# Patient Record
Sex: FEMALE | Employment: UNEMPLOYED | URBAN - METROPOLITAN AREA
[De-identification: names, ages, dates, MRNs, and addresses within clinical notes are randomized per-mention and may not be internally consistent; named-entity substitution may affect disease eponyms.]

---

## 2024-01-11 ENCOUNTER — OFFICE VISIT (OUTPATIENT)
Dept: FAMILY MEDICINE CLINIC | Age: 29
End: 2024-01-11
Payer: COMMERCIAL

## 2024-01-11 VITALS
WEIGHT: 289.8 LBS | RESPIRATION RATE: 18 BRPM | OXYGEN SATURATION: 98 % | BODY MASS INDEX: 51.35 KG/M2 | DIASTOLIC BLOOD PRESSURE: 68 MMHG | SYSTOLIC BLOOD PRESSURE: 94 MMHG | HEART RATE: 100 BPM | HEIGHT: 63 IN | TEMPERATURE: 97.8 F

## 2024-01-11 DIAGNOSIS — Z00.00 ENCOUNTER FOR MEDICAL EXAMINATION TO ESTABLISH CARE: Primary | ICD-10-CM

## 2024-01-11 DIAGNOSIS — F32.A ANXIETY AND DEPRESSION: ICD-10-CM

## 2024-01-11 DIAGNOSIS — M54.50 ACUTE BILATERAL LOW BACK PAIN WITHOUT SCIATICA: ICD-10-CM

## 2024-01-11 DIAGNOSIS — M54.2 CHRONIC NECK PAIN: ICD-10-CM

## 2024-01-11 DIAGNOSIS — G89.29 CHRONIC NECK PAIN: ICD-10-CM

## 2024-01-11 DIAGNOSIS — R20.0 NUMBNESS AND TINGLING IN BOTH HANDS: ICD-10-CM

## 2024-01-11 DIAGNOSIS — G47.00 INSOMNIA, UNSPECIFIED TYPE: ICD-10-CM

## 2024-01-11 DIAGNOSIS — R20.2 NUMBNESS AND TINGLING IN BOTH HANDS: ICD-10-CM

## 2024-01-11 DIAGNOSIS — F41.9 ANXIETY AND DEPRESSION: ICD-10-CM

## 2024-01-11 DIAGNOSIS — E08.00 DIABETES MELLITUS DUE TO UNDERLYING CONDITION WITH HYPEROSMOLARITY WITHOUT COMA, WITHOUT LONG-TERM CURRENT USE OF INSULIN (HCC): ICD-10-CM

## 2024-01-11 DIAGNOSIS — E55.9 VITAMIN D DEFICIENCY: ICD-10-CM

## 2024-01-11 LAB
ABSOLUTE IMMATURE GRANULOCYTE: 0.03 K/UL (ref 0–0.58)
ALBUMIN SERPL-MCNC: 4.6 G/DL (ref 3.5–5.2)
ALP BLD-CCNC: 76 U/L (ref 35–104)
ALT SERPL-CCNC: 29 U/L (ref 0–32)
ANION GAP SERPL CALCULATED.3IONS-SCNC: 17 MMOL/L (ref 7–16)
AST SERPL-CCNC: 26 U/L (ref 0–31)
BASOPHILS ABSOLUTE: 0.04 K/UL (ref 0–0.2)
BASOPHILS RELATIVE PERCENT: 1 % (ref 0–2)
BILIRUB SERPL-MCNC: 0.4 MG/DL (ref 0–1.2)
BUN BLDV-MCNC: 12 MG/DL (ref 6–20)
CALCIUM SERPL-MCNC: 9.6 MG/DL (ref 8.6–10.2)
CHLORIDE BLD-SCNC: 106 MMOL/L (ref 98–107)
CHOLESTEROL: 206 MG/DL
CO2: 17 MMOL/L (ref 22–29)
CREAT SERPL-MCNC: 0.6 MG/DL (ref 0.5–1)
EOSINOPHILS ABSOLUTE: 0.13 K/UL (ref 0.05–0.5)
EOSINOPHILS RELATIVE PERCENT: 2 % (ref 0–6)
GFR SERPL CREATININE-BSD FRML MDRD: >60 ML/MIN/1.73M2
GLUCOSE BLD-MCNC: 108 MG/DL (ref 74–99)
HBA1C MFR BLD: 5.8 %
HCT VFR BLD CALC: 40.4 % (ref 34–48)
HDLC SERPL-MCNC: 31 MG/DL
HEMOGLOBIN: 12.7 G/DL (ref 11.5–15.5)
IMMATURE GRANULOCYTES: 0 % (ref 0–5)
LDL CHOLESTEROL: 138 MG/DL
LYMPHOCYTES ABSOLUTE: 3.24 K/UL (ref 1.5–4)
LYMPHOCYTES RELATIVE PERCENT: 40 % (ref 20–42)
MCH RBC QN AUTO: 27.3 PG (ref 26–35)
MCHC RBC AUTO-ENTMCNC: 31.4 G/DL (ref 32–34.5)
MCV RBC AUTO: 86.9 FL (ref 80–99.9)
MONOCYTES ABSOLUTE: 0.61 K/UL (ref 0.1–0.95)
MONOCYTES RELATIVE PERCENT: 8 % (ref 2–12)
NEUTROPHILS ABSOLUTE: 4.05 K/UL (ref 1.8–7.3)
NEUTROPHILS RELATIVE PERCENT: 50 % (ref 43–80)
PDW BLD-RTO: 13.6 % (ref 11.5–15)
PLATELET # BLD: 326 K/UL (ref 130–450)
PMV BLD AUTO: 10.6 FL (ref 7–12)
POTASSIUM SERPL-SCNC: 4.2 MMOL/L (ref 3.5–5)
RBC # BLD: 4.65 M/UL (ref 3.5–5.5)
SODIUM BLD-SCNC: 140 MMOL/L (ref 132–146)
TOTAL PROTEIN: 8 G/DL (ref 6.4–8.3)
TRIGL SERPL-MCNC: 185 MG/DL
TSH SERPL DL<=0.05 MIU/L-ACNC: 2.07 UIU/ML (ref 0.27–4.2)
VITAMIN D 25-HYDROXY: 13 NG/ML (ref 30–100)
VLDLC SERPL CALC-MCNC: 37 MG/DL
WBC # BLD: 8.1 K/UL (ref 4.5–11.5)

## 2024-01-11 PROCEDURE — 83036 HEMOGLOBIN GLYCOSYLATED A1C: CPT | Performed by: STUDENT IN AN ORGANIZED HEALTH CARE EDUCATION/TRAINING PROGRAM

## 2024-01-11 PROCEDURE — 99204 OFFICE O/P NEW MOD 45 MIN: CPT | Performed by: STUDENT IN AN ORGANIZED HEALTH CARE EDUCATION/TRAINING PROGRAM

## 2024-01-11 RX ORDER — SEMAGLUTIDE 2.68 MG/ML
INJECTION, SOLUTION SUBCUTANEOUS
Qty: 12 ML | Refills: 1 | Status: SHIPPED | OUTPATIENT
Start: 2024-01-11

## 2024-01-11 RX ORDER — VENLAFAXINE HYDROCHLORIDE 75 MG/1
75 CAPSULE, EXTENDED RELEASE ORAL NIGHTLY
Qty: 90 CAPSULE | Refills: 1 | Status: SHIPPED | OUTPATIENT
Start: 2024-01-11

## 2024-01-11 RX ORDER — QUETIAPINE FUMARATE 300 MG/1
300 TABLET, FILM COATED ORAL NIGHTLY
Qty: 90 TABLET | Refills: 1 | Status: SHIPPED | OUTPATIENT
Start: 2024-01-11

## 2024-01-11 RX ORDER — TRAZODONE HYDROCHLORIDE 50 MG/1
50 TABLET ORAL DAILY PRN
Qty: 90 TABLET | Refills: 1 | Status: SHIPPED | OUTPATIENT
Start: 2024-01-11

## 2024-01-11 RX ORDER — CARIPRAZINE 6 MG/1
6 CAPSULE, GELATIN COATED ORAL NIGHTLY
Qty: 90 CAPSULE | Refills: 1 | Status: SHIPPED | OUTPATIENT
Start: 2024-01-11

## 2024-01-11 RX ORDER — ESCITALOPRAM OXALATE 20 MG/1
20 TABLET ORAL DAILY
Qty: 30 TABLET | Refills: 3 | Status: SHIPPED | OUTPATIENT
Start: 2024-01-11

## 2024-01-11 SDOH — ECONOMIC STABILITY: FOOD INSECURITY: WITHIN THE PAST 12 MONTHS, THE FOOD YOU BOUGHT JUST DIDN'T LAST AND YOU DIDN'T HAVE MONEY TO GET MORE.: NEVER TRUE

## 2024-01-11 SDOH — ECONOMIC STABILITY: HOUSING INSECURITY
IN THE LAST 12 MONTHS, WAS THERE A TIME WHEN YOU DID NOT HAVE A STEADY PLACE TO SLEEP OR SLEPT IN A SHELTER (INCLUDING NOW)?: NO

## 2024-01-11 SDOH — ECONOMIC STABILITY: FOOD INSECURITY: WITHIN THE PAST 12 MONTHS, YOU WORRIED THAT YOUR FOOD WOULD RUN OUT BEFORE YOU GOT MONEY TO BUY MORE.: NEVER TRUE

## 2024-01-11 SDOH — ECONOMIC STABILITY: INCOME INSECURITY: HOW HARD IS IT FOR YOU TO PAY FOR THE VERY BASICS LIKE FOOD, HOUSING, MEDICAL CARE, AND HEATING?: NOT HARD AT ALL

## 2024-01-11 ASSESSMENT — PATIENT HEALTH QUESTIONNAIRE - PHQ9
2. FEELING DOWN, DEPRESSED OR HOPELESS: 1
SUM OF ALL RESPONSES TO PHQ9 QUESTIONS 1 & 2: 2
SUM OF ALL RESPONSES TO PHQ QUESTIONS 1-9: 2
SUM OF ALL RESPONSES TO PHQ QUESTIONS 1-9: 2
1. LITTLE INTEREST OR PLEASURE IN DOING THINGS: 1
SUM OF ALL RESPONSES TO PHQ QUESTIONS 1-9: 2
SUM OF ALL RESPONSES TO PHQ QUESTIONS 1-9: 2

## 2024-01-11 ASSESSMENT — ENCOUNTER SYMPTOMS
RHINORRHEA: 0
VOMITING: 0
EYE PAIN: 0
NAUSEA: 0
SINUS PRESSURE: 0
COUGH: 0
ABDOMINAL PAIN: 0
EYE REDNESS: 0
SHORTNESS OF BREATH: 0
SINUS PAIN: 0
CONSTIPATION: 0
SORE THROAT: 0
BACK PAIN: 1
DIARRHEA: 0

## 2024-01-11 NOTE — PROGRESS NOTES
1/11/2024    [facility]  Chief Complaint   Patient presents with    New Patient    Diabetes    Back Pain    Neck Pain    Numbness       Laurie Dexter is a 28 y.o. female who  has a past medical history of Anxiety, Depression, and Type 2 diabetes mellitus without complication (HCC).     Patient is here today to establish care myself.  Patient has no known allergies.  Surgical history includes cholecystectomy.    DM2:   Patient is here to fu regarding DM2. Patient is  controlled. Taking all medications and tolerating well. Patient is not taking ASA and Ace Inhibitor/ARB. Patient is not on appropriately-dosed statin.   BP is  controlled.  does not hypoglycemic episodes. Patient does not see Podiatry regularly.  Patient is aware that it is necessary to see an Eye Dr yearly.  Patient does not smoke.  Most recent labs reviewed with patient.  Patient does have complaints or concerns today.      Lab Results   Component Value Date    LABA1C 5.8 01/11/2024     Back Pain  Patient presents for evaluation of low back problems. Symptoms have been present for 3 days and include  pain in lower back . Initial inciting event: none. Alleviating factors identifiable by the patient are  certain movements . Aggravating factors identifiable by the patient are bending backwards and bending forwards. Treatments initiated by the patient: none. Previous lower back problems:  she was told she had scoliosis in the past . Previous work up: none. Previous treatments: none.    Neck Pain  Patient complains of neck pain. Event that precipitated these symptoms: none known. Onset of symptoms several  years  ago, and have been gradually worsening since that time. Current symptoms are  pain in neck, numbness and tingling in both her hands .  Patient has had recurrent self limited episodes of neck pain in the past. Previous treatments include: none.         Review of Systems   Constitutional:  Negative for chills, fatigue and fever.   HENT:  Negative for

## 2024-01-12 DIAGNOSIS — E55.9 VITAMIN D DEFICIENCY: ICD-10-CM

## 2024-01-12 DIAGNOSIS — E55.9 VITAMIN D DEFICIENCY: Primary | ICD-10-CM

## 2024-01-12 LAB — VITAMIN B-12: 421 PG/ML (ref 211–946)

## 2024-01-12 RX ORDER — ERGOCALCIFEROL 1.25 MG/1
50000 CAPSULE ORAL WEEKLY
Qty: 12 CAPSULE | Refills: 1 | Status: SHIPPED | OUTPATIENT
Start: 2024-01-12

## 2024-01-12 RX ORDER — ERGOCALCIFEROL 1.25 MG/1
50000 CAPSULE ORAL WEEKLY
Qty: 12 CAPSULE | Refills: 1 | Status: SHIPPED
Start: 2024-01-12 | End: 2024-01-12 | Stop reason: SDUPTHER

## 2024-03-29 ENCOUNTER — HOSPITAL ENCOUNTER (INPATIENT)
Age: 29
LOS: 4 days | Discharge: HOME | DRG: 885 | End: 2024-04-03
Attending: EMERGENCY MEDICINE | Admitting: PSYCHIATRY & NEUROLOGY
Payer: COMMERCIAL

## 2024-03-29 DIAGNOSIS — T50.902A INTENTIONAL OVERDOSE, INITIAL ENCOUNTER (HCC): ICD-10-CM

## 2024-03-29 DIAGNOSIS — T14.91XA SUICIDE ATTEMPT (HCC): Primary | ICD-10-CM

## 2024-03-29 LAB
ALBUMIN SERPL-MCNC: 4.5 G/DL (ref 3.5–5.2)
ALP SERPL-CCNC: 79 U/L (ref 35–104)
ALT SERPL-CCNC: 34 U/L (ref 0–32)
ANION GAP SERPL CALCULATED.3IONS-SCNC: 10 MMOL/L (ref 7–16)
APAP SERPL-MCNC: <5 UG/ML (ref 10–30)
AST SERPL-CCNC: 27 U/L (ref 0–31)
BASOPHILS # BLD: 0.03 K/UL (ref 0–0.2)
BASOPHILS NFR BLD: 0 % (ref 0–2)
BILIRUB SERPL-MCNC: 0.3 MG/DL (ref 0–1.2)
BUN SERPL-MCNC: 14 MG/DL (ref 6–20)
CALCIUM SERPL-MCNC: 9.6 MG/DL (ref 8.6–10.2)
CHLORIDE SERPL-SCNC: 104 MMOL/L (ref 98–107)
CO2 SERPL-SCNC: 25 MMOL/L (ref 22–29)
CREAT SERPL-MCNC: 0.6 MG/DL (ref 0.5–1)
EOSINOPHIL # BLD: 0.16 K/UL (ref 0.05–0.5)
EOSINOPHILS RELATIVE PERCENT: 2 % (ref 0–6)
ERYTHROCYTE [DISTWIDTH] IN BLOOD BY AUTOMATED COUNT: 13.2 % (ref 11.5–15)
ETHANOLAMINE SERPL-MCNC: <10 MG/DL
GFR SERPL CREATININE-BSD FRML MDRD: >90 ML/MIN/1.73M2
GLUCOSE SERPL-MCNC: 160 MG/DL (ref 74–99)
HCT VFR BLD AUTO: 41.5 % (ref 34–48)
HGB BLD-MCNC: 13.1 G/DL (ref 11.5–15.5)
IMM GRANULOCYTES # BLD AUTO: 0.03 K/UL (ref 0–0.58)
IMM GRANULOCYTES NFR BLD: 0 % (ref 0–5)
LYMPHOCYTES NFR BLD: 2.78 K/UL (ref 1.5–4)
LYMPHOCYTES RELATIVE PERCENT: 37 % (ref 20–42)
MCH RBC QN AUTO: 27.2 PG (ref 26–35)
MCHC RBC AUTO-ENTMCNC: 31.6 G/DL (ref 32–34.5)
MCV RBC AUTO: 86.3 FL (ref 80–99.9)
MONOCYTES NFR BLD: 0.59 K/UL (ref 0.1–0.95)
MONOCYTES NFR BLD: 8 % (ref 2–12)
NEUTROPHILS NFR BLD: 52 % (ref 43–80)
NEUTS SEG NFR BLD: 3.88 K/UL (ref 1.8–7.3)
PLATELET # BLD AUTO: 293 K/UL (ref 130–450)
PMV BLD AUTO: 10 FL (ref 7–12)
POTASSIUM SERPL-SCNC: 4.1 MMOL/L (ref 3.5–5)
PROT SERPL-MCNC: 8 G/DL (ref 6.4–8.3)
RBC # BLD AUTO: 4.81 M/UL (ref 3.5–5.5)
SALICYLATES SERPL-MCNC: <0.3 MG/DL (ref 0–30)
SODIUM SERPL-SCNC: 139 MMOL/L (ref 132–146)
TOXIC TRICYCLIC SC,BLOOD: NEGATIVE
WBC OTHER # BLD: 7.5 K/UL (ref 4.5–11.5)

## 2024-03-29 PROCEDURE — 99285 EMERGENCY DEPT VISIT HI MDM: CPT

## 2024-03-29 PROCEDURE — 80053 COMPREHEN METABOLIC PANEL: CPT

## 2024-03-29 PROCEDURE — 93005 ELECTROCARDIOGRAM TRACING: CPT | Performed by: EMERGENCY MEDICINE

## 2024-03-29 PROCEDURE — 80307 DRUG TEST PRSMV CHEM ANLYZR: CPT

## 2024-03-29 PROCEDURE — 80143 DRUG ASSAY ACETAMINOPHEN: CPT

## 2024-03-29 PROCEDURE — G0480 DRUG TEST DEF 1-7 CLASSES: HCPCS

## 2024-03-29 PROCEDURE — 80179 DRUG ASSAY SALICYLATE: CPT

## 2024-03-29 PROCEDURE — 85025 COMPLETE CBC W/AUTO DIFF WBC: CPT

## 2024-03-29 RX ORDER — SODIUM CHLORIDE 9 MG/ML
INJECTION, SOLUTION INTRAVENOUS CONTINUOUS
Status: DISCONTINUED | OUTPATIENT
Start: 2024-03-29 | End: 2024-04-03 | Stop reason: HOSPADM

## 2024-03-29 ASSESSMENT — PAIN - FUNCTIONAL ASSESSMENT: PAIN_FUNCTIONAL_ASSESSMENT: NONE - DENIES PAIN

## 2024-03-30 PROBLEM — F31.9 BIPOLAR 1 DISORDER (HCC): Status: ACTIVE | Noted: 2024-03-30

## 2024-03-30 LAB
AMPHET UR QL SCN: NEGATIVE
BARBITURATES UR QL SCN: NEGATIVE
BENZODIAZ UR QL: NEGATIVE
BILIRUB UR QL STRIP: NEGATIVE
BUPRENORPHINE UR QL: NEGATIVE
CANNABINOIDS UR QL SCN: NEGATIVE
CLARITY UR: CLEAR
COCAINE UR QL SCN: NEGATIVE
COLOR UR: YELLOW
COMMENT: ABNORMAL
EKG ATRIAL RATE: 88 BPM
EKG ATRIAL RATE: 97 BPM
EKG P AXIS: 45 DEGREES
EKG P AXIS: 49 DEGREES
EKG P-R INTERVAL: 160 MS
EKG P-R INTERVAL: 174 MS
EKG Q-T INTERVAL: 308 MS
EKG Q-T INTERVAL: 364 MS
EKG QRS DURATION: 92 MS
EKG QRS DURATION: 94 MS
EKG QTC CALCULATION (BAZETT): 372 MS
EKG QTC CALCULATION (BAZETT): 462 MS
EKG R AXIS: 75 DEGREES
EKG R AXIS: 81 DEGREES
EKG T AXIS: 31 DEGREES
EKG T AXIS: 36 DEGREES
EKG VENTRICULAR RATE: 88 BPM
EKG VENTRICULAR RATE: 97 BPM
FENTANYL UR QL: NEGATIVE
GLUCOSE UR STRIP-MCNC: NEGATIVE MG/DL
HCG, URINE, POC: NEGATIVE
HGB UR QL STRIP.AUTO: NEGATIVE
KETONES UR STRIP-MCNC: NEGATIVE MG/DL
LEUKOCYTE ESTERASE UR QL STRIP: NEGATIVE
Lab: NORMAL
METHADONE UR QL: NEGATIVE
NEGATIVE QC PASS/FAIL: NORMAL
NITRITE UR QL STRIP: NEGATIVE
OPIATES UR QL SCN: NEGATIVE
OXYCODONE UR QL SCN: NEGATIVE
PCP UR QL SCN: NEGATIVE
PH UR STRIP: 6 [PH] (ref 5–9)
POSITIVE QC PASS/FAIL: NORMAL
PROT UR STRIP-MCNC: NEGATIVE MG/DL
SP GR UR STRIP: >1.03 (ref 1–1.03)
TEST INFORMATION: NORMAL
UROBILINOGEN UR STRIP-ACNC: 1 EU/DL (ref 0–1)

## 2024-03-30 PROCEDURE — 81003 URINALYSIS AUTO W/O SCOPE: CPT

## 2024-03-30 PROCEDURE — 80307 DRUG TEST PRSMV CHEM ANLYZR: CPT

## 2024-03-30 PROCEDURE — 93010 ELECTROCARDIOGRAM REPORT: CPT | Performed by: INTERNAL MEDICINE

## 2024-03-30 PROCEDURE — 1240000000 HC EMOTIONAL WELLNESS R&B

## 2024-03-30 PROCEDURE — 93005 ELECTROCARDIOGRAM TRACING: CPT | Performed by: EMERGENCY MEDICINE

## 2024-03-30 RX ORDER — HALOPERIDOL 5 MG/1
5 TABLET ORAL EVERY 6 HOURS PRN
Status: DISCONTINUED | OUTPATIENT
Start: 2024-03-30 | End: 2024-04-03 | Stop reason: HOSPADM

## 2024-03-30 RX ORDER — HALOPERIDOL 5 MG/ML
5 INJECTION INTRAMUSCULAR EVERY 6 HOURS PRN
Status: DISCONTINUED | OUTPATIENT
Start: 2024-03-30 | End: 2024-04-03 | Stop reason: HOSPADM

## 2024-03-30 RX ORDER — MAGNESIUM HYDROXIDE/ALUMINUM HYDROXICE/SIMETHICONE 120; 1200; 1200 MG/30ML; MG/30ML; MG/30ML
30 SUSPENSION ORAL PRN
Status: DISCONTINUED | OUTPATIENT
Start: 2024-03-30 | End: 2024-04-03 | Stop reason: HOSPADM

## 2024-03-30 RX ORDER — HYDROXYZINE PAMOATE 50 MG/1
50 CAPSULE ORAL 3 TIMES DAILY PRN
Status: DISCONTINUED | OUTPATIENT
Start: 2024-03-30 | End: 2024-04-03 | Stop reason: HOSPADM

## 2024-03-30 RX ORDER — LANOLIN ALCOHOL/MO/W.PET/CERES
3 CREAM (GRAM) TOPICAL NIGHTLY PRN
Status: DISCONTINUED | OUTPATIENT
Start: 2024-03-30 | End: 2024-04-03 | Stop reason: HOSPADM

## 2024-03-30 RX ORDER — HYDROXYZINE HYDROCHLORIDE 10 MG/1
50 TABLET, FILM COATED ORAL 3 TIMES DAILY PRN
Status: DISCONTINUED | OUTPATIENT
Start: 2024-03-30 | End: 2024-03-30 | Stop reason: SDUPTHER

## 2024-03-30 RX ORDER — NICOTINE 21 MG/24HR
1 PATCH, TRANSDERMAL 24 HOURS TRANSDERMAL DAILY
Status: DISCONTINUED | OUTPATIENT
Start: 2024-03-30 | End: 2024-03-30

## 2024-03-30 RX ORDER — ACETAMINOPHEN 325 MG/1
650 TABLET ORAL EVERY 6 HOURS PRN
Status: DISCONTINUED | OUTPATIENT
Start: 2024-03-30 | End: 2024-04-03 | Stop reason: HOSPADM

## 2024-03-30 ASSESSMENT — SLEEP AND FATIGUE QUESTIONNAIRES
AVERAGE NUMBER OF SLEEP HOURS: 5
DO YOU HAVE DIFFICULTY SLEEPING: COMMENT
DO YOU USE A SLEEP AID: COMMENT

## 2024-03-30 ASSESSMENT — LIFESTYLE VARIABLES
HOW MANY STANDARD DRINKS CONTAINING ALCOHOL DO YOU HAVE ON A TYPICAL DAY: PATIENT DECLINED
HOW OFTEN DO YOU HAVE A DRINK CONTAINING ALCOHOL: PATIENT DECLINED

## 2024-03-30 NOTE — ED NOTES
Patient changed into hosp gown and pants one bag of belongings locked in G lockers. C/O at bedside

## 2024-03-30 NOTE — ED NOTES
Call placed with Brennan SCHUMACHER/ Dr. ALEJO Garcia to present patient for admission.  Message left awaiting a return phone call.

## 2024-03-30 NOTE — ED NOTES
Leena called saying that she was told that her granddaughter ws in the ED and would be admitted for psychiatric care.  She was asking status of patient.  Patient agreeable to let grandmother (Leena Cochran) know she is ok and is still in the ED but she does not wish to speak to her at this time. Leena # [phone]. That limited information was given.

## 2024-03-30 NOTE — ED NOTES
Patient has been accepted for admission to [facility] by Dr. Garcia. Patient to go to [facility]. Room #2321B. Called admitting and notified Marisabel.    Electronically signed by KRISTAN Macias, SOL on 3/30/2024 at 12:46 PM

## 2024-03-30 NOTE — ED NOTES
Nurse to nurse report given to Srinivasa YOST on [facility] which includes patient presentation complaint, pink slip, medications, lab results EKG Qtc, and past medical/ psych history and admitting orders.

## 2024-03-30 NOTE — PLAN OF CARE
Problem: Anxiety  Goal: Will report anxiety at manageable levels  Description: INTERVENTIONS:  1. Administer medication as ordered  2. Teach and rehearse alternative coping skills  3. Provide emotional support with 1:1 interaction with staff  Outcome: Progressing     Problem: Coping  Goal: Pt/Family able to verbalize concerns and demonstrate effective coping strategies  Description: INTERVENTIONS:  1. Assist patient/family to identify coping skills, available support systems and cultural and spiritual values  2. Provide emotional support, including active listening and acknowledgement of concerns of patient and caregivers  3. Reduce environmental stimuli, as able  4. Instruct patient/family in relaxation techniques, as appropriate  5. Assess for spiritual pain/suffering and initiate Spiritual Care, Psychosocial Clinical Specialist consults as needed  Outcome: Progressing     Problem: Change in Body Image  Goal: Pt/Family communicate acceptance of loss or change in body image and feel psychological comfort and peace  Description: INTERVENTIONS:  1. Assess patient/family anxiety and grief process related to change in body image, loss of functional status, loss of sense of self, and forgiveness  2. Provide emotional and spiritual support  3. Provide information about the patient's health status with consideration of family and cultural values  4. Communicate willingness to discuss loss and facilitate grief process with patient/family as appropriate  5. Emphasize sustaining relationships within family system and community, or neeta/spiritual traditions  6. Initiate Spiritual Care, Psychosocial Clinical Specialist consult as needed  Outcome: Progressing     Problem: Decision Making  Goal: Pt/Family able to effectively weigh alternatives and participate in decision making related to treatment and care  Description: INTERVENTIONS:  1. Determine when there are differences between patient's view, family's view, and

## 2024-03-30 NOTE — ED NOTES
Behavioral Health Crisis Assessment      Chief Complaint:     \"Pt reports taking fifteen 50mg trazodone approx 40 min ago. Pt denies chest pain, SOB, N/V. Pt also states she has made prior suicide attempts similar\"    Mental Status Exam:     Patient remains uncooperative with assessment process. Appears alert & oriented to self, situation, and place. CHAIM pt awareness of date/time. Patient thought process is CHAIM - however at the time of the ED Physician assessment she appeared to have a clear thought process. Speech was noted to be normal when she would engage with staff. CHAIM A/V hallucinations.    Legal Status:  [] Voluntary:  [x] Involuntary, Issued by: ED Physician    Gender:  [] Male [x] Female [] Transgender  [] Other    Sexual Orientation:  [x] Heterosexual [] Homosexual [] Bisexual [] Other    Brief Clinical Summary:    Patient is a 28 year old, female presenting to ER for reported suicide attempt by overdose on Trazadone. Patient allegedly took 15 of her 50 mg Trazadone pills. Patient did not disclose reason for suicidal ideation.    SW reviewed patient medical record. Patient is from [place], [place] and began presenting with [facility] providers in January 2024. Patient had reported a hx of depression, anxiety, & insomnia to her new PCP Jazmín Vargas DO. She reported a previous hx of being prescribed Lexapro, Seroquel, Effexor, & Trazadone. Patient was seen by her PCP on 1/11/2024 and was continued on her above medications, with the addition of Vraylar. At the time of her evaluation, she was noted to be dysphoric, & anxious/nervous.    Patient hx of psychiatric hospitalizations is unknown at this time. However likely, due to patient reports to the ED Physician on this encounter - that she has a hx of multiple suicide attempts.    Collateral Information:    N/A    Patient reports that she resides at   [address] [place] [id number]    Possible relative - Trey Cai?    Patient maiden name is Laurie Negrete

## 2024-03-30 NOTE — ED PROVIDER NOTES
tenderness  Abdomen: Soft.  Non tender. Non distended.  +BS.  No rebound, guarding, or rigidity. No pulsatile masses appreciated.  Musculoskeletal: Moves all extremities x 4. Warm and well perfused, no clubbing, cyanosis, or edema. Capillary refill <3 seconds  Skin: warm and dry. No rashes.   Neurologic: GCS 15, CN 2-12 grossly intact, no focal deficits, symmetric strength 5/5 in the upper and lower extremities bilaterally  Psych: Affect is flat depressed patient suicidal not homicidal no hallucination    -------------------------------------------------- RESULTS -------------------------------------------------  I have personally reviewed all laboratory and imaging results for this patient. Results are listed below.     LABS:  Results for orders placed or performed during the hospital encounter of 03/29/24   CBC with Auto Differential   Result Value Ref Range    WBC 7.5 4.5 - 11.5 k/uL    RBC 4.81 3.50 - 5.50 m/uL    Hemoglobin 13.1 11.5 - 15.5 g/dL    Hematocrit 41.5 34.0 - 48.0 %    MCV 86.3 80.0 - 99.9 fL    MCH 27.2 26.0 - 35.0 pg    MCHC 31.6 (L) 32.0 - 34.5 g/dL    RDW 13.2 11.5 - 15.0 %    Platelets 293 130 - 450 k/uL    MPV 10.0 7.0 - 12.0 fL    Neutrophils % 52 43.0 - 80.0 %    Lymphocytes % 37 20.0 - 42.0 %    Monocytes % 8 2.0 - 12.0 %    Eosinophils % 2 0 - 6 %    Basophils % 0 0.0 - 2.0 %    Immature Granulocytes 0 0.0 - 5.0 %    Neutrophils Absolute 3.88 1.80 - 7.30 k/uL    Lymphocytes Absolute 2.78 1.50 - 4.00 k/uL    Monocytes Absolute 0.59 0.10 - 0.95 k/uL    Eosinophils Absolute 0.16 0.05 - 0.50 k/uL    Basophils Absolute 0.03 0.00 - 0.20 k/uL    Absolute Immature Granulocyte 0.03 0.00 - 0.58 k/uL   Comprehensive Metabolic Panel   Result Value Ref Range    Sodium 139 132 - 146 mmol/L    Potassium 4.1 3.5 - 5.0 mmol/L    Chloride 104 98 - 107 mmol/L    CO2 25 22 - 29 mmol/L    Anion Gap 10 7 - 16 mmol/L    Glucose 160 (H) 74 - 99 mg/dL    BUN 14 6 - 20 mg/dL    Creatinine 0.6 0.50 - 1.00 mg/dL

## 2024-03-30 NOTE — ED NOTES
Poison Control called with recommendations of monitoring for symptoms of CNS depression, hypotension, and potential serotonin syndrome and seizures with a 6 hour observation period from ingestion.

## 2024-03-30 NOTE — ED NOTES
Pt refused to speak with SW. ED doc notified. Pt to be assessed in the am, pt is on an involuntary hold.

## 2024-03-30 NOTE — CARE COORDINATION
SW attempted to meet with the pt to complete biopsychosocial assessment. Pt observed standing in the milieu. Pt barely shook her head yes when SW was confirming SW was speaking to the right person. Pt shook her head no when SW asked to go sit down and talk. Pt stated she is not answering any questions because \"I don't need to.\" SW encouraged the pt to answer the questions at a later time as refusing to participate in treatment planning will hinder her discharge. Pt just looked at SW and then looked away. SW will try again at a later time.

## 2024-03-31 PROBLEM — F31.4 BIPOLAR 1 DISORDER, DEPRESSED, SEVERE (HCC): Status: ACTIVE | Noted: 2024-03-30

## 2024-03-31 PROCEDURE — 90792 PSYCH DIAG EVAL W/MED SRVCS: CPT | Performed by: PSYCHIATRY & NEUROLOGY

## 2024-03-31 PROCEDURE — 6370000000 HC RX 637 (ALT 250 FOR IP): Performed by: NURSE PRACTITIONER

## 2024-03-31 PROCEDURE — 6370000000 HC RX 637 (ALT 250 FOR IP): Performed by: PSYCHIATRY & NEUROLOGY

## 2024-03-31 PROCEDURE — 1240000000 HC EMOTIONAL WELLNESS R&B

## 2024-03-31 RX ORDER — CALCIUM CARBONATE 500 MG/1
500 TABLET, CHEWABLE ORAL 3 TIMES DAILY PRN
Status: DISCONTINUED | OUTPATIENT
Start: 2024-03-31 | End: 2024-04-03 | Stop reason: HOSPADM

## 2024-03-31 RX ORDER — OXCARBAZEPINE 150 MG/1
150 TABLET, FILM COATED ORAL 2 TIMES DAILY
Status: DISCONTINUED | OUTPATIENT
Start: 2024-03-31 | End: 2024-04-01

## 2024-03-31 RX ORDER — ARIPIPRAZOLE 5 MG/1
5 TABLET ORAL DAILY
Status: DISCONTINUED | OUTPATIENT
Start: 2024-03-31 | End: 2024-04-01

## 2024-03-31 RX ORDER — ONDANSETRON 4 MG/1
4 TABLET, ORALLY DISINTEGRATING ORAL EVERY 8 HOURS PRN
Status: DISCONTINUED | OUTPATIENT
Start: 2024-03-31 | End: 2024-04-03 | Stop reason: HOSPADM

## 2024-03-31 RX ADMIN — ONDANSETRON 4 MG: 4 TABLET, ORALLY DISINTEGRATING ORAL at 17:28

## 2024-03-31 RX ADMIN — OXCARBAZEPINE 150 MG: 150 TABLET, FILM COATED ORAL at 20:59

## 2024-03-31 RX ADMIN — ALUMINUM HYDROXIDE, MAGNESIUM HYDROXIDE, AND SIMETHICONE 30 ML: 200; 200; 20 SUSPENSION ORAL at 17:11

## 2024-03-31 RX ADMIN — OXCARBAZEPINE 150 MG: 150 TABLET, FILM COATED ORAL at 13:07

## 2024-03-31 ASSESSMENT — PAIN - FUNCTIONAL ASSESSMENT: PAIN_FUNCTIONAL_ASSESSMENT: ACTIVITIES ARE NOT PREVENTED

## 2024-03-31 ASSESSMENT — PATIENT HEALTH QUESTIONNAIRE - PHQ9
SUM OF ALL RESPONSES TO PHQ9 QUESTIONS 1 & 2: 0
SUM OF ALL RESPONSES TO PHQ QUESTIONS 1-9: 0
SUM OF ALL RESPONSES TO PHQ QUESTIONS 1-9: 0
1. LITTLE INTEREST OR PLEASURE IN DOING THINGS: NOT AT ALL
SUM OF ALL RESPONSES TO PHQ QUESTIONS 1-9: 0
2. FEELING DOWN, DEPRESSED OR HOPELESS: NOT AT ALL
SUM OF ALL RESPONSES TO PHQ QUESTIONS 1-9: 0

## 2024-03-31 ASSESSMENT — SLEEP AND FATIGUE QUESTIONNAIRES
DO YOU USE A SLEEP AID: NO
DO YOU HAVE DIFFICULTY SLEEPING: NO

## 2024-03-31 ASSESSMENT — PAIN DESCRIPTION - LOCATION: LOCATION: CHEST

## 2024-03-31 ASSESSMENT — PAIN DESCRIPTION - DESCRIPTORS: DESCRIPTORS: BURNING

## 2024-03-31 ASSESSMENT — PAIN SCALES - GENERAL
PAINLEVEL_OUTOF10: 0
PAINLEVEL_OUTOF10: 5

## 2024-03-31 ASSESSMENT — LIFESTYLE VARIABLES
HOW MANY STANDARD DRINKS CONTAINING ALCOHOL DO YOU HAVE ON A TYPICAL DAY: PATIENT DOES NOT DRINK
HOW OFTEN DO YOU HAVE A DRINK CONTAINING ALCOHOL: NEVER

## 2024-03-31 ASSESSMENT — PAIN DESCRIPTION - ORIENTATION: ORIENTATION: MID

## 2024-03-31 NOTE — H&P
Department of Psychiatry  History and Physical - Adult     CHIEF COMPLAINT: Suicide attempt by overdose on trazodone    History obtained from: Patient AND medical record    Patient was seen after discussing with the treatment team and reviewing the chart\    CIRCUMSTANCES OF ADMISSION: 28-year-old -American female with history of depression presented to the hospital after overdosing on trazodone.  Urine drug screen and alcohol level negative.  Patient was placed on involuntary psychiatric hold and has been admitted for psychiatric stabilization and evaluation.    HISTORY OF PRESENT ILLNESS:      28-year-old -American female with history of bipolar disorder presented to the hospital after overdosing on trazodone. Urine drug screen and alcohol level negative.  Patient was placed on involuntary psychiatric hold and has been admitted for psychiatric stabilization and evaluation.  Patient tried to attempt suicide by overdosing on trazodone she allegedly took 15, 50 mg trazodone pills.  Patient says that she does not know what the trigger for this was.  Patient is looking to die however is unable to explain why she wanted to die and what was going on that day that led to that decision.  According to the chart the patient has multiple previous suicide attempts however she did not elaborate on any of these.  Patient lives in [facility] and recently moved here from [place] to stay with her brother.  The patient says she has a theatric providers in [place] however does not have any here and gets her medications from PCP.  Patient says that she takes a lot of medications and she does not know any of their names.  According to the chart she is on Lexapro Seroquel Effexor trazodone recently prescribed Vraylar.  On examination the patient is lying in bed and she is extremely blunted.  She is very soft voice and monotone.  Patient denies any auditory visual hallucinations paranoia and delusional thought she denies

## 2024-03-31 NOTE — PLAN OF CARE
Problem: Coping  Goal: Pt/Family able to verbalize concerns and demonstrate effective coping strategies  Description: INTERVENTIONS:  1. Assist patient/family to identify coping skills, available support systems and cultural and spiritual values  2. Provide emotional support, including active listening and acknowledgement of concerns of patient and caregivers  3. Reduce environmental stimuli, as able  4. Instruct patient/family in relaxation techniques, as appropriate  5. Assess for spiritual pain/suffering and initiate Spiritual Care, Psychosocial Clinical Specialist consults as needed  3/31/2024 0917 by Srinivasa Campos RN  Outcome: Progressing  Flowsheets (Taken 3/31/2024 0910)  Patient/family able to verbalize anxieties, fears, and concerns, and demonstrate effective coping: Assist patient/family to identify coping skills, available support systems and cultural and spiritual values  3/30/2024 2155 by Fausto Bowers RN  Outcome: Progressing     Problem: Anxiety  Goal: Will report anxiety at manageable levels  Description: INTERVENTIONS:  1. Administer medication as ordered  2. Teach and rehearse alternative coping skills  3. Provide emotional support with 1:1 interaction with staff  3/31/2024 0917 by Srinivasa Campos, RN  Outcome: Progressing  Flowsheets (Taken 3/31/2024 0910)  Will report anxiety at manageable levels: Administer medication as ordered  3/30/2024 2155 by Fausto Bowers, RN  Outcome: Progressing

## 2024-03-31 NOTE — GROUP NOTE
Group Therapy Note    Date: 3/31/2024    Group Start Time: 1055  Group End Time: 1135  Group Topic: Cognitive Skills    [facility] ACUTE BH 1    Juan Pablo Valladares MSW, SOL        Group Therapy Note    Attendees: 13       Patient's Goal: to participate in [facility].     Notes: pt was an active listener in group.     Status After Intervention:  Unchanged    Participation Level: Active Listener    Participation Quality: Attentive      Speech:  normal      Thought Process/Content: Logical      Affective Functioning: Flat      Mood: depressed      Level of consciousness:  Alert and Oriented x4      Response to Learning: Able to verbalize current knowledge/experience, Able to verbalize/acknowledge new learning, and Able to retain information      Endings: None Reported    Modes of Intervention: Education, Support, Socialization, Exploration, Clarifying, and Problem-solving      Discipline Responsible: /Counselor      Signature:  KRISTAN Vickers, SOL

## 2024-03-31 NOTE — CARE COORDINATION
SW attempted to meet with the pt to complete biopsychosocial assessment. Pt observed laying in bed. Pt briefly made eye contact with SW when SW approached and said her name. Pt shook her head \"no\" when SW informed the pt she needed to answer a few questions. Pt did not engage with SW further. SW re-encouraged the pt to talk with SW later today as refusing to speak with staff is only going to hinder her discharge. Pt rolled away from SW at this time. SW will try again at a later time.

## 2024-03-31 NOTE — CARE COORDINATION
Biopsychosocial Assessment Note    Social work met with patient to complete the biopsychosocial assessment and C-SSRS.     Chief Complaint: pt reports \"because they pink slipped me.\"     Mental Status Exam: pt alert&oriented x3. Pt minimally cooperative, guarded, withdrawn, disheveled. Pt mood unstable, blunted affect. Pt eye contact poor. Pt primarily shook her head yes or no. Pt would provide low in volume responses if she could not answer yes or no. Pt would not elaborate on any of her answers. Pt thoughts impaired, poverty of content. Pt insight/judgement poor. Pt denied SI/HI/AVH.     Clinical Summary: pt reports she is in the hospital because she was pink slipped. Pt shrugged her shoulders when asked why she was pink slipped. Pt denied any recent stressors. Per ER provider note, \" 28 y.o. female with history of depression and polycystic ovarian disease presenting to the ED for overdose of trazadone, beginning short time ago.  The complaint has been persistent, moderate in severity, and worsened by nothing.  Patient with history of depression presenting her because of overdose of trazodone she took over 15 tablets.  They are about 50 mg apiece.  Patient reports she is on Vraylar as well as Lexapro as well as Seroquel and Ozempic and metformin.  But she reports she only took trazodone.  Patient reports feeling depressed and reports she feels \"done \".\"    Pt reports one previous inpatient psychiatric admission at \"[facility]\" in [place] a couple of years ago. Pt is not currently connected with an outpatient mental health provider. Per ER SW note, \"Patient had reported a hx of depression, anxiety, & insomnia to her new PCP Jazmín Vargas DO. She reported a previous hx of being prescribed Lexapro, Seroquel, Effexor, & Trazadone. Patient was seen by her PCP on 1/11/2024 and was continued on her above medications, with the addition of Vraylar.\" Pt denied any hx of suicidal thoughts, suicide attempts, or

## 2024-03-31 NOTE — PLAN OF CARE
Problem: Anxiety  Goal: Will report anxiety at manageable levels  Description: INTERVENTIONS:  1. Administer medication as ordered  2. Teach and rehearse alternative coping skills  3. Provide emotional support with 1:1 interaction with staff  3/30/2024 2155 by Fausto Bowers RN  Outcome: Progressing     Problem: Coping  Goal: Pt/Family able to verbalize concerns and demonstrate effective coping strategies  Description: INTERVENTIONS:  1. Assist patient/family to identify coping skills, available support systems and cultural and spiritual values  2. Provide emotional support, including active listening and acknowledgement of concerns of patient and caregivers  3. Reduce environmental stimuli, as able  4. Instruct patient/family in relaxation techniques, as appropriate  5. Assess for spiritual pain/suffering and initiate Spiritual Care, Psychosocial Clinical Specialist consults as needed  3/30/2024 2155 by Fausto Bowers RN  Outcome: Progressing     Problem: Change in Body Image  Goal: Pt/Family communicate acceptance of loss or change in body image and feel psychological comfort and peace  Description: INTERVENTIONS:  1. Assess patient/family anxiety and grief process related to change in body image, loss of functional status, loss of sense of self, and forgiveness  2. Provide emotional and spiritual support  3. Provide information about the patient's health status with consideration of family and cultural values  4. Communicate willingness to discuss loss and facilitate grief process with patient/family as appropriate  5. Emphasize sustaining relationships within family system and community, or neeta/spiritual traditions  6. Initiate Spiritual Care, Psychosocial Clinical Specialist consult as needed  3/30/2024 2155 by Fausto Bowers RN  Outcome: Progressing     Problem: Decision Making  Goal: Pt/Family able to effectively weigh alternatives and participate in decision making related to treatment and

## 2024-04-01 PROBLEM — F60.89 CLUSTER B PERSONALITY DISORDER (HCC): Status: ACTIVE | Noted: 2024-04-01

## 2024-04-01 PROCEDURE — 1240000000 HC EMOTIONAL WELLNESS R&B

## 2024-04-01 PROCEDURE — 99232 SBSQ HOSP IP/OBS MODERATE 35: CPT | Performed by: NURSE PRACTITIONER

## 2024-04-01 PROCEDURE — 6370000000 HC RX 637 (ALT 250 FOR IP): Performed by: NURSE PRACTITIONER

## 2024-04-01 PROCEDURE — 6370000000 HC RX 637 (ALT 250 FOR IP): Performed by: PSYCHIATRY & NEUROLOGY

## 2024-04-01 RX ORDER — QUETIAPINE FUMARATE 300 MG/1
300 TABLET, FILM COATED ORAL NIGHTLY
Status: ON HOLD | COMMUNITY
End: 2024-04-03

## 2024-04-01 RX ORDER — SEMAGLUTIDE 2.68 MG/ML
2 INJECTION, SOLUTION SUBCUTANEOUS
COMMUNITY

## 2024-04-01 RX ORDER — ESCITALOPRAM OXALATE 10 MG/1
10 TABLET ORAL DAILY
Status: DISCONTINUED | OUTPATIENT
Start: 2024-04-01 | End: 2024-04-03 | Stop reason: HOSPADM

## 2024-04-01 RX ORDER — METFORMIN HYDROCHLORIDE 500 MG/1
500 TABLET, FILM COATED, EXTENDED RELEASE ORAL
Status: ON HOLD | COMMUNITY
End: 2024-04-01

## 2024-04-01 RX ORDER — METFORMIN HYDROCHLORIDE 500 MG/1
500 TABLET, EXTENDED RELEASE ORAL
Status: DISCONTINUED | OUTPATIENT
Start: 2024-04-02 | End: 2024-04-03 | Stop reason: HOSPADM

## 2024-04-01 RX ORDER — ESCITALOPRAM OXALATE 20 MG/1
20 TABLET ORAL NIGHTLY
Status: ON HOLD | COMMUNITY
End: 2024-04-03 | Stop reason: HOSPADM

## 2024-04-01 RX ORDER — VENLAFAXINE HYDROCHLORIDE 75 MG/1
75 CAPSULE, EXTENDED RELEASE ORAL NIGHTLY
Status: ON HOLD | COMMUNITY
End: 2024-04-03 | Stop reason: HOSPADM

## 2024-04-01 RX ORDER — TRAZODONE HYDROCHLORIDE 50 MG/1
50 TABLET ORAL NIGHTLY
Status: ON HOLD | COMMUNITY
End: 2024-04-03 | Stop reason: HOSPADM

## 2024-04-01 RX ORDER — METFORMIN HYDROCHLORIDE 500 MG/1
500 TABLET, EXTENDED RELEASE ORAL
COMMUNITY

## 2024-04-01 RX ADMIN — ONDANSETRON 4 MG: 4 TABLET, ORALLY DISINTEGRATING ORAL at 10:30

## 2024-04-01 RX ADMIN — ACETAMINOPHEN 650 MG: 325 TABLET ORAL at 21:14

## 2024-04-01 RX ADMIN — ACETAMINOPHEN 650 MG: 325 TABLET ORAL at 10:29

## 2024-04-01 RX ADMIN — ESCITALOPRAM OXALATE 10 MG: 10 TABLET ORAL at 14:10

## 2024-04-01 RX ADMIN — QUETIAPINE FUMARATE 300 MG: 200 TABLET ORAL at 21:14

## 2024-04-01 ASSESSMENT — PAIN SCALES - GENERAL
PAINLEVEL_OUTOF10: 9
PAINLEVEL_OUTOF10: 9

## 2024-04-01 ASSESSMENT — PAIN DESCRIPTION - DESCRIPTORS
DESCRIPTORS: ACHING;THROBBING
DESCRIPTORS: NUMBNESS;DISCOMFORT

## 2024-04-01 ASSESSMENT — PAIN - FUNCTIONAL ASSESSMENT
PAIN_FUNCTIONAL_ASSESSMENT: ACTIVITIES ARE NOT PREVENTED
PAIN_FUNCTIONAL_ASSESSMENT: ACTIVITIES ARE NOT PREVENTED

## 2024-04-01 ASSESSMENT — PAIN DESCRIPTION - LOCATION
LOCATION: HEAD
LOCATION: ARM

## 2024-04-01 ASSESSMENT — PAIN DESCRIPTION - ORIENTATION: ORIENTATION: RIGHT

## 2024-04-01 NOTE — BH NOTE
Patient resting in bed with eyes closed. Respirations even and non labored. Safety Rounds done Q15 Minutes. Will continue to monitor.  
Patient resting in bed with eyes closed. Respirations even and non labored. Safety Rounds done Q15 Minutes. Will continue to monitor.  
Patient was out on the unit most of the afternoon. She attended groups and was med compliant though she is still not happy with the medication orders. She reports she does not understand why her medications need changed. Advised patient to discuss with NP tomorrow.  
Pt denies chest pain at this time, seen watching TV. Pt appears to be without distress.   Pt was earlier medicated with Zofran ODT.   
Pt denies suicidal / homicidal ideations.  Pt denies hallucinations.  Pt has poverty of speech and any verbal responses are one worded responses of yes or no.   Pt eye contact is fair to avoidant.   Negative immediate behavioral concerns at this time.   
Pt denies suicidal / homicidal ideations.  Pt denies hallucinations.  Pt is without immediate distress. Pt has very little communication. And stares most of the time and various locations in the day room.   No other further assessment can be accomplished and pt also appears to engage in thought blocking.   
Pt given Maalox liquid but spit most of it out. Contacted NP to assess if Zofran OK to give.   No other complications time.   
Pt had a large emesis in bathroom, pt having heartburn pain. Skin warm/dry. Pt without other distress. Will attempt to give Maalox.   
Refused newly scheduled Abilify, did take newly scheduled Trileptal  
Medication compliance, Individualized assessments and Care planning.    Outcome: Needs Reinforcement    PATIENT GOALS: To be discussed with patient within 72 hours    PLAN/TREATMENT RECOMMENDATIONS:     Continue group therapy , Medications compliance, Goal setting, Individualized assessments and Care planning, continue to monitor patient on unit.      SHORT-TERM GOALS:   Time frame for Short-Term Goals: Daily re assessment.     LONG-TERM GOALS:  Time frame for Long-Term Goals: 5-7 days.   Members Present in Team Meeting: See Signature Sheet    Srinivasa Campos RN

## 2024-04-01 NOTE — PLAN OF CARE
Problem: Anxiety  Goal: Will report anxiety at manageable levels  Description: INTERVENTIONS:  1. Administer medication as ordered  2. Teach and rehearse alternative coping skills  3. Provide emotional support with 1:1 interaction with staff  3/31/2024 2115 by Fausto Bowers, RN  Outcome: Progressing     Problem: Coping  Goal: Pt/Family able to verbalize concerns and demonstrate effective coping strategies  Description: INTERVENTIONS:  1. Assist patient/family to identify coping skills, available support systems and cultural and spiritual values  2. Provide emotional support, including active listening and acknowledgement of concerns of patient and caregivers  3. Reduce environmental stimuli, as able  4. Instruct patient/family in relaxation techniques, as appropriate  5. Assess for spiritual pain/suffering and initiate Spiritual Care, Psychosocial Clinical Specialist consults as needed  3/31/2024 2115 by Fausto Bowers RN  Outcome: Progressing     Problem: Change in Body Image  Goal: Pt/Family communicate acceptance of loss or change in body image and feel psychological comfort and peace  Description: INTERVENTIONS:  1. Assess patient/family anxiety and grief process related to change in body image, loss of functional status, loss of sense of self, and forgiveness  2. Provide emotional and spiritual support  3. Provide information about the patient's health status with consideration of family and cultural values  4. Communicate willingness to discuss loss and facilitate grief process with patient/family as appropriate  5. Emphasize sustaining relationships within family system and community, or neeta/spiritual traditions  6. Initiate Spiritual Care, Psychosocial Clinical Specialist consult as needed  Outcome: Progressing  Flowsheets (Taken 3/31/2024 0910 by Srinivasa Campos, RN)  Patient/family communicate acceptance of loss or change in body image and feel psychological comfort and peace: Assess patient/family anxiety

## 2024-04-01 NOTE — GROUP NOTE
Group Therapy Note    Date: 4/1/2024    Group Start Time: 1530  Group End Time: 1600  Group Topic: Recreational    [facility] 2    Steff Cruz CTRS    Group Therapy Note    Attendees: 11    Date: 4/1/2024  Start Time: 1430  End Time:  1500  Number of Participants: 11    Type of Group: Recreational    Name:  Laughter Activities    Patient's Goal:  ID how laughter is beneficial for wellbeing. ID ways to promote laughter.    Notes:  CTRS lead group discussion how laughter is beneficial for wellbeing. Encouraged patients to share their experiences. CTRS read dad jokes and patients filled out mad libs to promote laughter. Patient stared at CTRS blankly throughout group.    Status After Intervention:  Unchanged    Participation Level: None    Participation Quality: Resistant      Speech:  mute      Thought Process/Content: None observed      Affective Functioning: Blunted      Mood:  Flat      Level of consciousness:  Alert      Response to Learning: Resistant      Endings: None Reported    Modes of Intervention: Education, Support, Socialization, Exploration, Clarifying, and Problem-solving      Discipline Responsible: Psychoeducational Specialist      Signature:  ESA Rice

## 2024-04-01 NOTE — GROUP NOTE
Group Therapy Note    Date: 4/1/2024    Group Start Time: 1100  Group End Time: 1135  Group Topic: Cognitive Skills    [facility]    Zia Del Cid MSW        Group Therapy Note    Attendees: 10       Patient's Goal:  To actively participate in group discussion on April Fool's day Trivia and critical thinking.    Notes:  Patient was an active listener in group discussion    Status After Intervention:  Unchanged    Participation Level: Active Listener    Participation Quality: Appropriate and Attentive      Speech:  normal      Thought Process/Content: Linear      Affective Functioning: Congruent      Mood: anxious      Level of consciousness:  Alert and Oriented x4      Response to Learning: Able to verbalize current knowledge/experience, Able to verbalize/acknowledge new learning, and Able to retain information      Endings: None Reported    Modes of Intervention: Education, Support, Socialization, Exploration, Clarifying, and Problem-solving      Discipline Responsible: /Counselor      Signature:  KRISTAN Lo

## 2024-04-01 NOTE — CARE COORDINATION
MARCO contacted pt  Tessy  (DONOVAN signed) to gain collateral. He states that pt was sad, but he thinks she is ok now. He states that he has spoken with pt today and she sounds like herself. He just wants to make sure pt medications are regulated and she is doing better. He states that pt lives with her brother and is able to return. He denied pt access to guns/weapons. He denied any other questions/concerns for pt.  He was minimal in conversation.

## 2024-04-01 NOTE — GROUP NOTE
Group Therapy Note    Date: 4/1/2024    Group Start Time: 0945  Group End Time: 1020  Group Topic: Psychoeducation    [facility]    Steff Cruz CTRS    Group Therapy Note    Attendees: 11    Date: 4/1/2024  Start Time: 0945  End Time:  1020  Number of Participants: 11    Type of Group: Psychoeducation    Name:  Coping with Change    Patient's Goal:  ID how change can affect mental health. ID stages of change and ways to cope with change.    Notes:  CTRS lead educational group discussion on coping with change. Encouraged patients to share their experiences. Patient did not add to group discussion and left group before group concluded.    Status After Intervention:  Unchanged    Participation Level: None    Participation Quality: Resistant      Speech:  mute      Thought Process/Content: None observed      Affective Functioning: Blunted      Mood:  Flat      Level of consciousness:  Inattentive      Response to Learning: Resistant      Endings: None Reported    Modes of Intervention: Education, Support, Socialization, Exploration, Clarifying, and Problem-solving      Discipline Responsible: Psychoeducational Specialist      Signature:  ESA Rice

## 2024-04-01 NOTE — CARE COORDINATION
Per [facility] in [place], the patient is taking the following:  Ozempic 2mg q Week  Metformin ER 500mg daily  Lexapro 20mg qHS  Effexor XR 75mg qHS  Trazodone 50mg qHS  Seroquel 300mg qHS  Vraylar 6mg qHS    Pharmacy states the Vraylar was last filled in January but the patient states she is still currently taking this medication.

## 2024-04-01 NOTE — CARE COORDINATION
Pt was seen during treatment team. Pt states that she has been vomiting, is unsure if the medications are making her feel sick or if it is something else. She states that due to this, she has been refusing her mental health medications. Pt was able to name the medications she was on in the past for her mental health to the provider. She states that she would be agreeable to trying these medications again. Pt states that she plans to return home with her brother at discharge. She did agree to sign DONOVAN for her  whom she has been  to since 2017, states that he is caring for their children. Pt denied SI/HI/AVH.     SW was informed after treatment team by RN that pt requested SW fax hospital excuse to her employer.    MARCO met with pt who confirmed she wants SW to fax hospital excuse to [employer] [facility], provided fax number of  for . She states that she would also like SW to contact her  Holger  and let her know that MARCO faxed excuse to . She states that her employee ID is [id number].     Fax is not going through. MARCO called pt  holger. She states that pt will need to call their 800 number in order to speak with them about having paperwork sent to her for provider to fill out for leave of absence. She states that SW is not able to send a hospital excuse, that their specific paperwork will need completed. MARCO met with pt and provided phone number to her, advised that she will need to call on her own, also informed of of what her boss said, and informed her to keep SW updated. She verbalized understanding.

## 2024-04-01 NOTE — GROUP NOTE
Group Therapy Note    Date: 4/1/2024    Group Start Time: 0930  Group End Time: 0945  Group Topic: Community Meeting    [facility] 2    Steff Cruz CTRS    Group Therapy Note    Attendees: 13    Date: 4/1/2024  Start Time: 0930  End Time:  0945  Number of Participants: 13    Type of Group: Community Meeting    Was updated on expectations of the unit, staffing, and programming.  When asked for goal for the day, patient stared at [facility]. Patient would not shake her head yes or no if she had a goal for the day.    Status After Intervention:  Unchanged    Participation Level: None    Participation Quality: Resistant      Speech:  mute      Thought Process/Content: None observed      Affective Functioning: Blunted      Mood:  Flat      Level of consciousness:  Alert      Response to Learning: Resistant      Endings: None Reported    Modes of Intervention: Education, Support, Socialization, Exploration, Clarifying, and Problem-solving      Discipline Responsible: Psychoeducational Specialist      Signature:  ESA Rice

## 2024-04-02 PROCEDURE — 99232 SBSQ HOSP IP/OBS MODERATE 35: CPT | Performed by: NURSE PRACTITIONER

## 2024-04-02 PROCEDURE — 6370000000 HC RX 637 (ALT 250 FOR IP): Performed by: NURSE PRACTITIONER

## 2024-04-02 PROCEDURE — 1240000000 HC EMOTIONAL WELLNESS R&B

## 2024-04-02 RX ADMIN — QUETIAPINE FUMARATE 300 MG: 200 TABLET ORAL at 21:21

## 2024-04-02 RX ADMIN — METFORMIN HYDROCHLORIDE 500 MG: 500 TABLET, EXTENDED RELEASE ORAL at 10:06

## 2024-04-02 RX ADMIN — ESCITALOPRAM OXALATE 10 MG: 10 TABLET ORAL at 10:05

## 2024-04-02 ASSESSMENT — PAIN SCALES - GENERAL
PAINLEVEL_OUTOF10: 0
PAINLEVEL_OUTOF10: 0

## 2024-04-02 NOTE — GROUP NOTE
Group Therapy Note    Date: 4/2/2024    Group Start Time: 1045  Group End Time: 1120  Group Topic: Cognitive Skills    [facility] 1    Zia Del Cid MSW        Group Therapy Note    Attendees: 9       Patient's Goal:  To participate in group discussion on Coping skills for your Anger    Notes:  Pt was an active listener in group discussion    Status After Intervention:  Improved    Participation Level: Active Listener    Participation Quality: Appropriate and attentive    Speech:  Quite      Thought Process/Content: Linear      Affective Functioning: Congruent      Mood: depressed      Level of consciousness:  Alert, Oriented x4, and Attentive      Response to Learning: Able to verbalize current knowledge/experience, Able to verbalize/acknowledge new learning, and Able to retain information      Endings: None Reported    Modes of Intervention: Education, Support, Socialization, Exploration, Clarifying, and Problem-solving      Discipline Responsible: /Counselor      Signature:  KRISTAN Lo

## 2024-04-02 NOTE — CARE COORDINATION
SW met with pt in the common area to discuss her discharge plan. Pt denied to want to go in her room to speak. Pt stated that when she is discharge she will be going with her brother and her  will be picking her up from the hospital. Pt stated that there are no guns/weapons in the home and she wants to be set up with services in [place] that accepts her insurance. Pt stated that she has medicare. SW confirmed address with pt. Pt denied any SI, HI, AVH depression or anxiety.     SW provided pt with a list of MH agencys and the distance to them from her home. Pt stated that she does not want to go to [facility] because she has called them and they were unable to get her in for services until September. Pt stated that she would like to go to [facility] mental health services.

## 2024-04-02 NOTE — GROUP NOTE
Group Therapy Note    Date: 4/2/2024    Group Start Time: 0935  Group End Time: 1015  Group Topic: Psychoeducation    [facility] 1    Heather Fox, CTRS    Date: 4/2/2024  Module Name:  owning your feelings and what is underneath    Patient's Goal:  will be able to id feelings, beyond mad sad or bad and identify current state of emotional wellness.     Notes:  pleasant and engaged, willing to share when prompted and accepting of handout.     Status After Intervention:  Improved    Participation Level: Active Listener and Interactive    Participation Quality: Appropriate, Attentive, and Sharing      Speech:  normal      Thought Process/Content: Logical      Affective Functioning: Congruent      Mood: euthymic      Level of consciousness:  Alert, Oriented x4, and Attentive      Response to Learning: Able to retain information and Progressing to goal      Endings: None Reported    Modes of Intervention: Education, Support, Clarifying, and Problem-solving      Discipline Responsible: Psychoeducational Specialist      Signature:  Heather Fox, CTRS

## 2024-04-02 NOTE — PLAN OF CARE
Problem: Coping  Goal: Pt/Family able to verbalize concerns and demonstrate effective coping strategies  Description: INTERVENTIONS:  1. Assist patient/family to identify coping skills, available support systems and cultural and spiritual values  2. Provide emotional support, including active listening and acknowledgement of concerns of patient and caregivers  3. Reduce environmental stimuli, as able  4. Instruct patient/family in relaxation techniques, as appropriate  5. Assess for spiritual pain/suffering and initiate Spiritual Care, Psychosocial Clinical Specialist consults as needed  Outcome: Progressing     Problem: Change in Body Image  Goal: Pt/Family communicate acceptance of loss or change in body image and feel psychological comfort and peace  Description: INTERVENTIONS:  1. Assess patient/family anxiety and grief process related to change in body image, loss of functional status, loss of sense of self, and forgiveness  2. Provide emotional and spiritual support  3. Provide information about the patient's health status with consideration of family and cultural values  4. Communicate willingness to discuss loss and facilitate grief process with patient/family as appropriate  5. Emphasize sustaining relationships within family system and community, or neeta/spiritual traditions  6. Initiate Spiritual Care, Psychosocial Clinical Specialist consult as needed  Outcome: Progressing     Problem: Decision Making  Goal: Pt/Family able to effectively weigh alternatives and participate in decision making related to treatment and care  Description: INTERVENTIONS:  1. Determine when there are differences between patient's view, family's view, and healthcare provider's view of condition  2. Facilitate patient and family articulation of goals for care  3. Help patient and family identify pros/cons of alternative solutions  4. Provide information as requested by patient/family  5. Respect patient/family right to receive

## 2024-04-02 NOTE — GROUP NOTE
Declined to share goal for the day.                                                                       Group Therapy Note    Date: 4/2/2024    Group Start Time: 0920  Group End Time: 0935  Group Topic: Community Meeting    [facility] 1    Heather Fox CTRS    Module Name:  Community Meeting     Patient's Goal:  Patient will be able to id daily routine, goal for the day and staffing assignments.     Notes:  Pleasant and engaged appeared to be an active listener. Willing to share goal for the day.     Status After Intervention:  Improved    Participation Level: Active Listener and Interactive    Participation Quality: Appropriate, Attentive, and Sharing      Speech:  normal      Thought Process/Content: Logical      Affective Functioning: Congruent      Mood: euthymic      Level of consciousness:  Alert, Oriented x4, and Attentive      Response to Learning: Able to verbalize/acknowledge new learning, Able to retain information, and Progressing to goal      Endings: None Reported    Modes of Intervention: Education, Support, and Socialization      Discipline Responsible: Psychoeducational Specialist      Signature:  ESA Bess

## 2024-04-03 VITALS
BODY MASS INDEX: 49.61 KG/M2 | WEIGHT: 280 LBS | DIASTOLIC BLOOD PRESSURE: 52 MMHG | HEART RATE: 85 BPM | SYSTOLIC BLOOD PRESSURE: 99 MMHG | HEIGHT: 63 IN | TEMPERATURE: 97.4 F | RESPIRATION RATE: 16 BRPM | OXYGEN SATURATION: 97 %

## 2024-04-03 PROCEDURE — 99239 HOSP IP/OBS DSCHRG MGMT >30: CPT | Performed by: NURSE PRACTITIONER

## 2024-04-03 PROCEDURE — 6370000000 HC RX 637 (ALT 250 FOR IP): Performed by: NURSE PRACTITIONER

## 2024-04-03 RX ORDER — QUETIAPINE FUMARATE 300 MG/1
300 TABLET, FILM COATED ORAL NIGHTLY
Qty: 30 TABLET | Refills: 0 | Status: SHIPPED | OUTPATIENT
Start: 2024-04-03 | End: 2024-05-03

## 2024-04-03 RX ORDER — ESCITALOPRAM OXALATE 10 MG/1
10 TABLET ORAL DAILY
Qty: 30 TABLET | Refills: 0 | Status: SHIPPED | OUTPATIENT
Start: 2024-04-04 | End: 2024-05-04

## 2024-04-03 RX ADMIN — ESCITALOPRAM OXALATE 10 MG: 10 TABLET ORAL at 09:07

## 2024-04-03 RX ADMIN — METFORMIN HYDROCHLORIDE 500 MG: 500 TABLET, EXTENDED RELEASE ORAL at 09:07

## 2024-04-03 ASSESSMENT — PAIN SCALES - GENERAL: PAINLEVEL_OUTOF10: 0

## 2024-04-03 NOTE — CARE COORDINATION
MARCO called [facility] to schedule the pt with an intake appointment. MARCO was informed that there is no one currently at the desk right now that is able to schedule an appointment, MARCO left contact number to call back to schedule pt with an intake.     [facility]  Address: [facility] [place] [address]   Phone number [phone]  Fax [phone]

## 2024-04-03 NOTE — GROUP NOTE
Group Therapy Note    Date: 4/3/2024    Group Start Time: 1045  Group End Time: 1116  Group Topic: Cognitive Skills    [facility] 1    Zia Del Cid MSW        Group Therapy Note    Attendees: 15       Patient's Goal:  To actively participate in group discussion on using Meditation to assist with controlling your emotions.     Notes:  Pt was an active participant in group discussion    Status After Intervention:  Improved    Participation Level: Active Listener and Interactive    Participation Quality: Appropriate and Attentive      Speech:  normal      Thought Process/Content: Linear      Affective Functioning: Congruent      Mood: anxious      Level of consciousness:  Alert, Oriented x4, and Attentive      Response to Learning: Able to verbalize current knowledge/experience, Able to verbalize/acknowledge new learning, and Able to retain information      Endings: None Reported    Modes of Intervention: Education, Support, Socialization, Exploration, Clarifying, and Problem-solving      Discipline Responsible: /Counselor      Signature:  KRISTAN Lo

## 2024-04-03 NOTE — TRANSITION OF CARE
Behavioral Health Transition Record to Provider    Patient Name: Laurie Dexter  YOB: 1995   Medical Record Number: [id number]  Date of Admission: 3/29/2024  8:47 PM   Date of Discharge:  4/3/24     Attending Provider: Mackenzie Phillips MD   Discharging Provider:  JEN PHILLIPS  To contact this individual call  [phone] and ask the  to page.  If unavailable, ask to be transferred to Behavioral Health Provider on call.  A Behavioral Health Provider will be available on call 24/7 and during holidays.    Primary Care Provider: No primary care provider on file.    No Known Allergies    Reason for Admission:   CIRCUMSTANCES OF ADMISSION: 28-year-old -American female with history of depression presented to the hospital after overdosing on trazodone.  Urine drug screen and alcohol level negative.  Patient was placed on involuntary psychiatric hold and has been admitted for psychiatric stabilization and evaluation.    Admission Diagnosis: Suicide attempt ([facility]) [T14.91XA]  Bipolar 1 disorder ([facility]) [F31.9]  Intentional overdose, initial encounter ([facility]) [T50.902A]    * No surgery found *    Results for orders placed or performed during the hospital encounter of 03/29/24   CBC with Auto Differential   Result Value Ref Range    WBC 7.5 4.5 - 11.5 k/uL    RBC 4.81 3.50 - 5.50 m/uL    Hemoglobin 13.1 11.5 - 15.5 g/dL    Hematocrit 41.5 34.0 - 48.0 %    MCV 86.3 80.0 - 99.9 fL    MCH 27.2 26.0 - 35.0 pg    MCHC 31.6 (L) 32.0 - 34.5 g/dL    RDW 13.2 11.5 - 15.0 %    Platelets 293 130 - 450 k/uL    MPV 10.0 7.0 - 12.0 fL    Neutrophils % 52 43.0 - 80.0 %    Lymphocytes % 37 20.0 - 42.0 %    Monocytes % 8 2.0 - 12.0 %    Eosinophils % 2 0 - 6 %    Basophils % 0 0.0 - 2.0 %    Immature Granulocytes 0 0.0 - 5.0 %    Neutrophils Absolute 3.88 1.80 - 7.30 k/uL    Lymphocytes Absolute 2.78 1.50 - 4.00 k/uL    Monocytes Absolute 0.59 0.10 - 0.95 k/uL    Eosinophils Absolute 0.16 0.05 - 0.50 k/uL    Basophils

## 2024-04-03 NOTE — DISCHARGE SUMMARY
with the same name, and this prescription was added. Make sure you understand how and when to take each.           * This list has 2 medication(s) that are the same as other medications prescribed for you. Read the directions carefully, and ask your doctor or other care provider to review them with you.                CONTINUE taking these medications      metFORMIN 500 MG extended release tablet  Commonly known as: GLUCOPHAGE-XR     Ozempic (2 MG/DOSE) 8 MG/3ML Sopn sc injection  Generic drug: semaglutide (2 MG/DOSE)            STOP taking these medications      traZODone 50 MG tablet  Commonly known as: DESYREL     venlafaxine 75 MG extended release capsule  Commonly known as: EFFEXOR XR     Vraylar 6 MG Caps capsule  Generic drug: Cariprazine HCl               Where to Get Your Medications        These medications were sent to [facility] - [place] [address] - P [phone] - F [phone] [address], [facility] [address]      Phone: [phone]   escitalopram 10 MG tablet  QUEtiapine 300 MG tablet  QUEtiapine 300 MG tablet       Patient is counseled she has been compliant with all medications all outpatient follow appointments    Patient is discharged home in stable condition    TIME SPEND - 35 MINUTES TO COMPLETE THE EVALUATION, DISCHARGE SUMMARY, MEDICATION RECONCILIATION AND FOLLOW UP CARE     Signed:  TEO Grajeda CNP  4/3/2024  12:33 PM

## 2024-04-03 NOTE — CARE COORDINATION
MARCO met with pt to discuss discharge for today. Pt is alert and oriented x4. Pt's mood is neutral, affect is flat and blunt. Pt's speech is clear, rate is slow and volume is normal. Pt's insight and judgement is fair. Pt denied depression and anxiety. Pt denied SI, HI, AVH. Pt reported that she will be discharging to home and her  will be picking her up from the hospital. Pt stated that she is looking forward to eventually moving to [place] with her . Pt reported to feeling okay today and feeling better. Pt stated that she is unsure why she attempted to OD and she thinks it may have been because she was not talking to her support system for days and everything was building up. Pt stated that she has been talking to her support system more now. Pt denied having any paranoia. Pt stated that she has been feeling the same and she is ready to be discharged. Pt stated that she is erasing her name because she is in the process of changing her name through the probate court and she does not want to be called [facility].     MARCO called pt's  Tessy  (DONOVAN signed)  to discuss discharge planning. SW left a voicemail requesting a call back.      MARCO called [facility] and scheduled an intake appointment on 4/9 at 9 AM.     In order to ensure appropriate transition and discharge planning is in place, the following documents have been transmitted to [facility], as the new outpatient provider:    The d/c diagnosis was transmitted to the next care provider  The reason for hospitalization was transmitted to the next care provider  The d/c medications (dosage and indication) were transmitted to the next care provider   The continuing care plan was transmitted to the next care provider

## 2024-04-03 NOTE — PROGRESS NOTES
[facility]  Admission Note     Admission Type:   Admission Type: Involuntary    Reason for admission:  Reason for Admission: per pink slip: patient has history of depression, presented to ED for overdose of 15 tablets of Trazodone, patient reported feeling depressed and stated she feels \"done\" in ER      Addictive Behavior:   Addictive Behavior  In the Past 3 Months, Have You Felt or Has Someone Told You That You Have a Problem With  : Other (comment) (refuses to answer)    Medical Problems:   History reviewed. No pertinent past medical history.    Status EXAM:  Mental Status and Behavioral Exam  Normal: No  Level of Assistance: Independent/Self  Facial Expression: Avoids Gaze, Flat, Worried, Sad  Affect: Blunt  Level of Consciousness: Alert  Frequency of Checks: 4 times per hour, close  Mood:Normal: No  Mood: Anxious, Depressed, Sad, Suspicious, Irritable  Motor Activity:Normal: No  Motor Activity: Decreased  Eye Contact: Poor  Observed Behavior: Preoccupied, Guarded  Sexual Misconduct History: Current - no  Preception:  (refusing to answer)  Attention:Normal: No  Attention: Distractible  Thought Processes: Blocking  Thought Content:Normal: No  Thought Content: Poverty of content  Depression Symptoms: Feelings of helplessness  Anxiety Symptoms: Generalized  Sue Symptoms: No problems reported or observed.  Delusions: Yes  Delusions: Paranoid  Memory:Normal: No  Memory: Poor recent, Poor remote  Insight and Judgment: No  Insight and Judgment: Poor judgment, Poor insight    Tobacco Screening:  Practical Counseling, on admission, frankie X, if applicable and completed (first 3 are required if patient doesn't refuse):            ( ) Recognizing danger situations (included triggers and roadblocks)                    ( ) Coping skills (new ways to manage stress,relaxation techniques, changing routine, distraction)                                                           ( ) Basic information about 
4 eyes skin assessment deferred due to patient being uncooperative and not allowing skin assessment at this time.   
BEHAVIORAL HEALTH FOLLOW-UP NOTE     4/2/2024     Patient was seen and examined in person, Chart reviewed   Patient's case discussed with staff/team    Chief Complaint: Guarded evasive and irritable    Interim History:   Patient was seen in her room this morning.  She is evasive and guarded.  She denies suicidal ideations intent or plan she denies auditory or visual hallucinations.  She states that she is no longer having any nausea since starting back on her oral medications.  Staff indicates some paranoia misinterpreting overly focused on HIPAA violations due to staff reporting what unit they work on when they answer the phone.  She offers little conversation to me this morning denies suicidal ideations intent or plan denies auditory or visual hallucinations seems evasive during assessment      appetite: [x] Normal/Unchanged  [] Increased  [] Decreased      Sleep:       [x] Normal/Unchanged  [] Fair       [] Poor              Energy:    [x] Normal/Unchanged  [] Increased  [] Decreased        SI [] Present  [x] Absent    HI  []Present  [x] Absent     Aggression:  [] yes  [x] no    Patient is [x] able  [] unable to CONTRACT FOR SAFETY     PAST MEDICAL/PSYCHIATRIC HISTORY:   History reviewed. No pertinent past medical history.    FAMILY/SOCIAL HISTORY:  History reviewed. No pertinent family history.  Social History     Socioeconomic History    Marital status:      Spouse name: Not on file    Number of children: Not on file    Years of education: unknown    Highest education level: Not on file   Occupational History    Not on file   Tobacco Use    Smoking status: Unknown    Smokeless tobacco: Not on file    Tobacco comments:     Patient refusing to answer   Substance and Sexual Activity    Alcohol use: Defer    Drug use: Defer    Sexual activity: Defer   Other Topics Concern    Not on file   Social History Narrative    Not on file     Social Determinants of Health     Financial Resource Strain: Not on file 
CLINICAL PHARMACY NOTE: MEDS TO BEDS    Total # of Prescriptions Filled: 1   The following medications were delivered to the patient:  Escitalopram oxalate 10 mg  Quetiapine fumarate 300 mg    Additional Documentation:   Pt picked up in the pharmacy  
Isolative to room this evening denies any SI HI or mckinnon mood is irritable emotional support given   
Leisure assessment completed.  
Patient arrived to unit, refusing to stand up from wheelchair. Patient refusing to speak for approximately 20 minutes, did give PIN number and use phone to call family. Patient states is depressed, does not want to sign paperwork. Patient refusing to complete menus, refusing OQ Analyst. Patient shook head \"no\" when asked if hearing voices or if suicidal. Patient standing at nurses station counter, poor eye contact. Patient shakes head \"no\" when asked if she is on any home medications. Patient refusing to answer further questions at this time. Offered menus twice. Patient talking on phone at this time. No apparent distress. Emotional support given  
Patient is awake, alert, oriented. She is quiet, irritable, demeaning in her speech. She is misinterpreting at times. She believes she should be discharged. She reports she lives with her brother but that she is also  since 2017 and is unclear with those details other than she has kids that the  have and live in [place]. She reports she has a job at [employer] in [employer], which she is concerned about missing. She was provided with their phone number so she can call work. She reports she has been having vomiting since starting on Abilify and Trileptal. She did refuse these medications this morning and during treatment team she alluded to wanting to go back on all of her home medications even though she ODd on Trazodone which she states was \"both to sleep and to try to kill myself.\" She reports she is having chronic right arm pain that hurts whenever she uses her arm and never gets better but then also complains about having a \"headache that is hurting my whole body.\" She did have a witnessed episode of emesis this morning. She did take Zofran ODT prior to taking the Tylenol, though she states the Zofran doesn't work for her. She reports anxiety is 0/10 and depression is 2/10. She denies SI/HI/AVH. Med rec completed. Encouraged groups. Will continue to monitor.  
Refusing vital signs at this time.   
Rested well this shift no distress   
Rested well this shift no distress   
Resting quietly in bed with eyes closed q 15minute checks continued throughout shift  
Resting quietly in bed with eyes closed q 15minute checks continued throughout shift  
  Other Topics Concern    Not on file   Social History Narrative    Not on file     Social Determinants of Health     Financial Resource Strain: Not on file   Food Insecurity: Patient Declined (3/30/2024)    Hunger Vital Sign     Worried About Running Out of Food in the Last Year: Patient declined     Ran Out of Food in the Last Year: Patient declined   Transportation Needs: Patient Declined (3/30/2024)    PRAPARE - Transportation     Lack of Transportation (Medical): Patient declined     Lack of Transportation (Non-Medical): Patient declined   Physical Activity: Not on file   Stress: Not on file   Social Connections: Not on file   Intimate Partner Violence: Not on file   Housing Stability: Patient Declined (3/30/2024)    Housing Stability Vital Sign     Unable to Pay for Housing in the Last Year: Patient declined     Number of Places Lived in the Last Year: 1     Unstable Housing in the Last Year: Patient declined           ROS:  [x] All negative/unchanged except if checked. Explain positive(checked items) below:  [] Constitutional  [] Eyes  [] Ear/Nose/Mouth/Throat  [] Respiratory  [] CV  [] GI  []   [] Musculoskeletal  [] Skin/Breast  [] Neurological  [] Endocrine  [] Heme/Lymph  [] Allergic/Immunologic    Explanation:     MEDICATIONS:    Current Facility-Administered Medications:     [START ON 4/2/2024] metFORMIN (MOD) (GLUMETZA) extended release tablet 500 mg, 500 mg, Oral, Daily with breakfast, Reba Albrecht APRN - CNP    QUEtiapine (SEROQUEL) tablet 300 mg, 300 mg, Oral, Nightly, Reba Albrecht APRN - CNP    escitalopram (LEXAPRO) tablet 10 mg, 10 mg, Oral, Daily, Reba Albrecht APRN - CNP    ondansetron (ZOFRAN-ODT) disintegrating tablet 4 mg, 4 mg, Oral, Q8H PRN, Vicki Amin APRN - CNP, 4 mg at 04/01/24 1030    calcium carbonate (TUMS) chewable tablet 500 mg, 500 mg, Oral, TID PRN, Vicki Amin APRN - CNP    acetaminophen (TYLENOL) tablet 650 mg, 650 mg, Oral, Q6H PRN, Mackenzie Garcia MD, 
information about quitting (benefits of quitting, techniques in how to quit, available resources  ( ) [facility]                                                                                                                   ( ) Patient refused counseling  ( ) Patient refused referral  ( ) Patient refused prescription upon discharge  ( x) Patient has not smoked in the last 30 days    Metabolic Screening:    No results found for: \"LABA1C\"    No results found for: \"CHOL\"  No results found for: \"TRIG\"  No results found for: \"HDL\"  No components found for: \"LDLCAL\"  No components found for: \"LABVLDL\"    Elissa Nye RN

## 2024-04-03 NOTE — GROUP NOTE
Group Therapy Note    Date: 4/3/2024    Group Start Time: 0930  Group End Time: 0945  Group Topic: Community Meeting    [facility]    Ste[facility]    Group Therapy Note    Attendees: 14    Date: 4/3/2024  Start Time: 0930  End Time:  0945  Number of Participants: 14    Type of Group: Community Meeting    Was updated on expectations of the unit, staffing, and programming.  Patient denied having a goal for the day.    Status After Intervention:  Improved    Participation Level: Active Listener and Interactive    Participation Quality: Appropriate, Attentive, Sharing, and Supportive      Speech:  normal      Thought Process/Content: Logical  Linear      Affective Functioning: Congruent      Mood:  Appropriate      Level of consciousness:  Alert and Attentive      Response to Learning: Able to verbalize current knowledge/experience, Able to verbalize/acknowledge new learning, Able to retain information, Capable of insight, Able to change behavior, and Progressing to goal      Endings: None Reported    Modes of Intervention: Education, Support, Socialization, Exploration, Clarifying, and Problem-solving      Discipline Responsible: Psychoeducational Specialist      Signature:  ESA Rice

## 2024-04-03 NOTE — PLAN OF CARE
Problem: Confusion  Goal: Confusion, delirium, dementia, or psychosis is improved or at baseline  Description: INTERVENTIONS:  1. Assess for possible contributors to thought disturbance, including medications, impaired vision or hearing, underlying metabolic abnormalities, dehydration, psychiatric diagnoses, and notify attending LIP  2. [place] high risk fall precautions, as indicated  3. Provide frequent short contacts to provide reality reorientation, refocusing and direction  4. Decrease environmental stimuli, including noise as appropriate  5. Monitor and intervene to maintain adequate nutrition, hydration, elimination, sleep and activity  6. If unable to ensure safety without constant attention obtain sitter and review sitter guidelines with assigned personnel  7. Initiate Psychosocial CNS and Spiritual Care consult, as indicated  Recent Flowsheet Documentation  Taken 4/2/2024 2227 by Laura Brizuela, RN  Effect of thought disturbance (confusion, delirium, dementia, or psychosis) are managed with adequate functional status: Assess for contributors to thought disturbance, including medications, impaired vision or hearing, underlying metabolic abnormalities, dehydration, psychiatric diagnoses, notify LIP  4/2/2024 1430 by Elissa Nye RN  Outcome: Not Progressing     Problem: Behavior  Goal: Pt/Family maintain appropriate behavior and adhere to behavioral management agreement, if implemented  Description: INTERVENTIONS:  1. Assess patient/family's coping skills and  non-compliant behavior (including use of illegal substances)  2. Notify security of behavior or suspected illegal substances which indicate the need for search of the family and/or belongings  3. Encourage verbalization of thoughts and concerns in a socially appropriate manner  4. Utilize positive, consistent limit setting strategies supporting safety of patient, staff and others  5. Encourage participation in the decision making process about

## 2024-04-03 NOTE — CARE COORDINATION
SW spoke with pt who stated that she will try to call her brother to come pick her up from the hospital. Pt was on the phone and provided her brother with the hospital address and RN station number to call upon arrival.

## 2024-04-04 NOTE — PLAN OF CARE
[facility]  [facility] Interdisciplinary Treatment Plan Note     Review Date & Time:  4/3/24 1000    Patient was in treatment team.    Estimated Length of Stay Update:   5 days   Estimated Discharge Date Update:  today    EDUCATION:   Learner Progress Toward Treatment Goals: Reviewed results and recommendations of this team    Method: Small group    Outcome: verbalized understanding    PATIENT GOALS: \"no goal\"    PLAN/TREATMENT RECOMMENDATIONS UPDATE:  discharged today to home with medications and follow up    GOALS UPDATE:  Time frame for Short-Term Goals:  5 days      Elissa Nye RN

## 2024-04-04 NOTE — CARE COORDINATION
SW contacted pt for post follow up post discharge phone call. Pt did not have any further questions or concerns at this time.

## 2024-04-15 ENCOUNTER — APPOINTMENT (OUTPATIENT)
Dept: CT IMAGING | Age: 29
End: 2024-04-15
Payer: COMMERCIAL

## 2024-04-15 ENCOUNTER — HOSPITAL ENCOUNTER (EMERGENCY)
Age: 29
Discharge: LEFT BEFORE DISCHARGE | End: 2024-04-16
Payer: COMMERCIAL

## 2024-04-15 VITALS
TEMPERATURE: 98.4 F | DIASTOLIC BLOOD PRESSURE: 84 MMHG | OXYGEN SATURATION: 97 % | RESPIRATION RATE: 16 BRPM | HEART RATE: 92 BPM | SYSTOLIC BLOOD PRESSURE: 141 MMHG

## 2024-04-15 DIAGNOSIS — R10.12 LEFT UPPER QUADRANT ABDOMINAL PAIN: Primary | ICD-10-CM

## 2024-04-15 LAB
ALBUMIN SERPL-MCNC: 4.8 G/DL (ref 3.5–5.2)
ALP SERPL-CCNC: 75 U/L (ref 35–104)
ALT SERPL-CCNC: 59 U/L (ref 0–32)
ANION GAP SERPL CALCULATED.3IONS-SCNC: 16 MMOL/L (ref 7–16)
AST SERPL-CCNC: 46 U/L (ref 0–31)
BACTERIA URNS QL MICRO: ABNORMAL
BASOPHILS # BLD: 0.05 K/UL (ref 0–0.2)
BASOPHILS NFR BLD: 1 % (ref 0–2)
BILIRUB SERPL-MCNC: 0.5 MG/DL (ref 0–1.2)
BILIRUB UR QL STRIP: NEGATIVE
BUN SERPL-MCNC: 12 MG/DL (ref 6–20)
CALCIUM SERPL-MCNC: 9.8 MG/DL (ref 8.6–10.2)
CHLORIDE SERPL-SCNC: 102 MMOL/L (ref 98–107)
CLARITY UR: CLEAR
CO2 SERPL-SCNC: 22 MMOL/L (ref 22–29)
COLOR UR: YELLOW
CREAT SERPL-MCNC: 0.6 MG/DL (ref 0.5–1)
EOSINOPHIL # BLD: 0.23 K/UL (ref 0.05–0.5)
EOSINOPHILS RELATIVE PERCENT: 3 % (ref 0–6)
EPI CELLS #/AREA URNS HPF: ABNORMAL /HPF
ERYTHROCYTE [DISTWIDTH] IN BLOOD BY AUTOMATED COUNT: 13.4 % (ref 11.5–15)
GFR SERPL CREATININE-BSD FRML MDRD: >90 ML/MIN/1.73M2
GLUCOSE SERPL-MCNC: 110 MG/DL (ref 74–99)
GLUCOSE UR STRIP-MCNC: NEGATIVE MG/DL
HCG, URINE, POC: NEGATIVE
HCT VFR BLD AUTO: 44.2 % (ref 34–48)
HGB BLD-MCNC: 13.8 G/DL (ref 11.5–15.5)
HGB UR QL STRIP.AUTO: NEGATIVE
IMM GRANULOCYTES # BLD AUTO: 0.04 K/UL (ref 0–0.58)
IMM GRANULOCYTES NFR BLD: 1 % (ref 0–5)
KETONES UR STRIP-MCNC: ABNORMAL MG/DL
LACTATE BLDV-SCNC: 1.5 MMOL/L (ref 0.5–2.2)
LEUKOCYTE ESTERASE UR QL STRIP: NEGATIVE
LIPASE SERPL-CCNC: 29 U/L (ref 13–60)
LYMPHOCYTES NFR BLD: 3.37 K/UL (ref 1.5–4)
LYMPHOCYTES RELATIVE PERCENT: 39 % (ref 20–42)
Lab: NORMAL
MCH RBC QN AUTO: 27.2 PG (ref 26–35)
MCHC RBC AUTO-ENTMCNC: 31.2 G/DL (ref 32–34.5)
MCV RBC AUTO: 87.2 FL (ref 80–99.9)
MONOCYTES NFR BLD: 0.91 K/UL (ref 0.1–0.95)
MONOCYTES NFR BLD: 11 % (ref 2–12)
NEGATIVE QC PASS/FAIL: NORMAL
NEUTROPHILS NFR BLD: 46 % (ref 43–80)
NEUTS SEG NFR BLD: 3.99 K/UL (ref 1.8–7.3)
NITRITE UR QL STRIP: NEGATIVE
PH UR STRIP: 6 [PH] (ref 5–9)
PLATELET # BLD AUTO: 342 K/UL (ref 130–450)
PMV BLD AUTO: 9.9 FL (ref 7–12)
POSITIVE QC PASS/FAIL: NORMAL
POTASSIUM SERPL-SCNC: 4.2 MMOL/L (ref 3.5–5)
PROT SERPL-MCNC: 8.4 G/DL (ref 6.4–8.3)
PROT UR STRIP-MCNC: NEGATIVE MG/DL
RBC # BLD AUTO: 5.07 M/UL (ref 3.5–5.5)
RBC #/AREA URNS HPF: ABNORMAL /HPF
SODIUM SERPL-SCNC: 140 MMOL/L (ref 132–146)
SP GR UR STRIP: >1.03 (ref 1–1.03)
UROBILINOGEN UR STRIP-ACNC: 0.2 EU/DL (ref 0–1)
WBC #/AREA URNS HPF: ABNORMAL /HPF
WBC OTHER # BLD: 8.6 K/UL (ref 4.5–11.5)

## 2024-04-15 PROCEDURE — 81001 URINALYSIS AUTO W/SCOPE: CPT

## 2024-04-15 PROCEDURE — 83605 ASSAY OF LACTIC ACID: CPT

## 2024-04-15 PROCEDURE — 74177 CT ABD & PELVIS W/CONTRAST: CPT

## 2024-04-15 PROCEDURE — 96374 THER/PROPH/DIAG INJ IV PUSH: CPT

## 2024-04-15 PROCEDURE — 80053 COMPREHEN METABOLIC PANEL: CPT

## 2024-04-15 PROCEDURE — 83690 ASSAY OF LIPASE: CPT

## 2024-04-15 PROCEDURE — 96375 TX/PRO/DX INJ NEW DRUG ADDON: CPT

## 2024-04-15 PROCEDURE — 2580000003 HC RX 258: Performed by: NURSE PRACTITIONER

## 2024-04-15 PROCEDURE — 6360000004 HC RX CONTRAST MEDICATION: Performed by: RADIOLOGY

## 2024-04-15 PROCEDURE — 6360000002 HC RX W HCPCS: Performed by: NURSE PRACTITIONER

## 2024-04-15 PROCEDURE — 85025 COMPLETE CBC W/AUTO DIFF WBC: CPT

## 2024-04-15 PROCEDURE — 99285 EMERGENCY DEPT VISIT HI MDM: CPT

## 2024-04-15 RX ORDER — 0.9 % SODIUM CHLORIDE 0.9 %
1000 INTRAVENOUS SOLUTION INTRAVENOUS ONCE
Status: COMPLETED | OUTPATIENT
Start: 2024-04-15 | End: 2024-04-16

## 2024-04-15 RX ORDER — KETOROLAC TROMETHAMINE 30 MG/ML
15 INJECTION, SOLUTION INTRAMUSCULAR; INTRAVENOUS ONCE
Status: COMPLETED | OUTPATIENT
Start: 2024-04-15 | End: 2024-04-15

## 2024-04-15 RX ORDER — ONDANSETRON 2 MG/ML
4 INJECTION INTRAMUSCULAR; INTRAVENOUS ONCE
Status: COMPLETED | OUTPATIENT
Start: 2024-04-15 | End: 2024-04-15

## 2024-04-15 RX ADMIN — IOPAMIDOL 75 ML: 755 INJECTION, SOLUTION INTRAVENOUS at 22:54

## 2024-04-15 RX ADMIN — KETOROLAC TROMETHAMINE 15 MG: 30 INJECTION, SOLUTION INTRAMUSCULAR at 20:18

## 2024-04-15 RX ADMIN — SODIUM CHLORIDE 1000 ML: 9 INJECTION, SOLUTION INTRAVENOUS at 20:17

## 2024-04-15 RX ADMIN — ONDANSETRON 4 MG: 2 INJECTION INTRAMUSCULAR; INTRAVENOUS at 20:19

## 2024-04-15 ASSESSMENT — PAIN SCALES - GENERAL: PAINLEVEL_OUTOF10: 5

## 2024-04-15 ASSESSMENT — PAIN DESCRIPTION - LOCATION: LOCATION: ABDOMEN

## 2024-04-15 NOTE — ED NOTES
Department of Emergency Medicine  FIRST PROVIDER TRIAGE NOTE             Independent MLP           4/15/24  5:45 PM EDT    Date of Encounter: 4/15/24   MRN: [id number]      HPI: Laurie Dexter is a 28 y.o. female who presents to the ED for No chief complaint on file.  Left sided stomach cramps starting last night.  States her stomach is always hard.  Always feels bloated.  States last bowel movement was today-normal.    ROS: Negative for cp or sob.    PE: Gen Appearance/Constitutional: alert  Musculoskeletal: moves all extremities x 4     Initial Plan of Care: All treatment areas with department are currently occupied. Plan to order/Initiate the following while awaiting opening in ED:  Initiate Treatment-Testing, Proceed toTreatment Area When Bed Available for ED Attending/MLP to Continue Care    Electronically signed by TEO Larson NP   DD: 4/15/24       Anthony Chase APRN - NP  04/15/24 2244

## 2024-04-16 NOTE — ED PROVIDER NOTES
Independent JAMAL Visit.       [facility] EMERGENCY DEPARTMENT  ED  Encounter Note  Admit Date/RoomTime: 4/15/2024  5:48 PM  ED Room: [place] [address]  NAME: Laurie Dexter  : 1995  MRN: [id number]  PCP: No primary care provider on file.    CHIEF COMPLAINT     Abdominal Pain (Left sided abd pains / cramping since last night.  States feeling bloated over the past couple years but worsened last night )    HISTORY OF PRESENT ILLNESS        Laurie Dexter is a 28 y.o. female who presents to the ED via private vehicle with left-sided abdominal pains cramping since last night and a bloated feeling.  Has had similar symptoms over the last 2 years but became worse last night.  No urinary symptoms.  Normal bowel movements.  Past surgical history positive for cholecystectomy  REVIEW OF SYSTEMS     Pertinent positives and negatives are stated within HPI, all other systems reviewed and are negative.    Past Medical History:  has no past medical history on file.  Surgical History:  has no past surgical history on file.  Social History: Alcohol use questions deferred to the physician. Drug use questions deferred to the physician.  Family History: family history is not on file.   Allergies: Patient has no known allergies.  CURRENT MEDICATIONS       Discharge Medication List as of 2024 12:24 AM        CONTINUE these medications which have NOT CHANGED    Details   escitalopram (LEXAPRO) 10 MG tablet Take 1 tablet by mouth daily, Disp-30 tablet, R-0Normal      !! QUEtiapine (SEROQUEL) 300 MG tablet Take 1 tablet by mouth nightly, Disp-30 tablet, R-0Normal      !! QUEtiapine (SEROQUEL) 300 MG tablet Take 1 tablet by mouth nightly, Disp-30 tablet, R-0Normal      semaglutide, 2 MG/DOSE, (OZEMPIC, 2 MG/DOSE,) 8 MG/3ML SOPN sc injection Inject 0.75 mLs into the skin every 7 daysHistorical Med      metFORMIN (GLUCOPHAGE-XR) 500 MG extended release tablet Take 1 tablet by mouth daily (with

## 2024-04-16 NOTE — ED NOTES
Patient sitting in wheelchair in waiting area, with eyes closed, vitals retaken, patient family member updated on labs, diagnostic testing and room status. Another warm blanket provided to patient no s/sx of distress, zero complaints of pain or discomfort at this time. Will continue to monitor. G

## 2024-07-26 DIAGNOSIS — G47.00 INSOMNIA, UNSPECIFIED TYPE: ICD-10-CM

## 2024-07-29 RX ORDER — QUETIAPINE FUMARATE 300 MG/1
300 TABLET, FILM COATED ORAL NIGHTLY
Qty: 90 TABLET | Refills: 0 | Status: SHIPPED | OUTPATIENT
Start: 2024-07-29